# Patient Record
(demographics unavailable — no encounter records)

---

## 2025-07-06 NOTE — HISTORY OF PRESENT ILLNESS
[FreeTextEntry1] : 20 y/o female with history of seizures, asthma s/p PICU admissions, back pain secondary to herniated discs.   Asthma: History of admissions to PICU for status asthmaticus, last in November 2020. She was started on controller medication in the hospital, and Gabby's mother continued it but her asthma had improved so she stopped using Flovent. Since then, has been only using albuterol as needed. Last symptoms of asthma were 2 weeks ago where Gabby woke up from sleeping with difficulty breathing and wheezing. Was also supposed to follow-up with pulmonology but because of her moving to and from [place], they did not follow-up with pulmonology. Patient's mother reports that when she was 2 years old was in the ICU for a month, had fluid in lungs, pneumonia, had chest tube placed.    Seizures: Reports that from ages 4-11, Gabby had seizures and was on Trileptal. Is no longer on any antiepileptics. Was following up with neurology up until 12-13 years old, but didn't continue to follow-up.  Back pain/ L paracentral HNP (herniated nucleus pulposis): Seen by peds orthopedics 11/21/22 after [facility] ED visit for back pain that radiated down L leg for months, associated with tingling and numbness, using cane to ambulate. They recommended MRI spine and f/u with adult ortho. Seen by adult ortho 12/16/22 with MRI showing L paracentral HNP with LR narrowing L3/4 and L paracentral HNP L4/5 encroaching on traversing root, rec to start gabapentin 200 mg at bedtime, PT referral, motrin, follow-up in 6 weeks. Patient did not start gabapentin, has recently started PT 2x/week.  Seen by pain management on 12/22/22, rec L4-5 lumbar epidural steroid injection, Gabby is going to have this next week on 1/18/23. Also recommended acupuncture, has not started it.  Seen in the ER on 12/22/22 for back pain, neurosurgery consulted - rec Valium PRN and medrol, recommended follow-up with Dr. Freeman 1 week after ER visit. Patient reports she took valium, didn't help much in terms of the pain. Did not follow-up with neurosurgery.  Has been taking Ibuprofen 600 mg 1x/day, has been walking with a cane.    Hidradenitis:  - in ER on 12/22/22 found to have multiple areas of folliculitis and drainage under L breast, rec topical antibiotic 2-3x/day, and follow-up with ID. Gabby used antibiotic cream on her lesions, has not follow-up with ID because the areas healed. She reports the areas are underneath both her arms, her lower abdomen, and underneath mostly the L breast. No open/actively draining lesions now.    Medications: ibuprofen 600 mg daily, albuterol PRN  Allergies: food allergies; fish and nuts, anaphylaxis, does not have Epipen  Vaccines: as stated above, the patient is behind on her immunizations, but patient's mother does not have her immunization records today, she will have them faxed for our review